# Patient Record
Sex: MALE | Race: OTHER | HISPANIC OR LATINO | URBAN - METROPOLITAN AREA
[De-identification: names, ages, dates, MRNs, and addresses within clinical notes are randomized per-mention and may not be internally consistent; named-entity substitution may affect disease eponyms.]

---

## 2018-06-25 ENCOUNTER — EMERGENCY (EMERGENCY)
Facility: HOSPITAL | Age: 33
LOS: 0 days | Discharge: HOME | End: 2018-06-25
Attending: EMERGENCY MEDICINE | Admitting: EMERGENCY MEDICINE

## 2018-06-25 VITALS
SYSTOLIC BLOOD PRESSURE: 123 MMHG | RESPIRATION RATE: 19 BRPM | OXYGEN SATURATION: 97 % | DIASTOLIC BLOOD PRESSURE: 67 MMHG | HEART RATE: 98 BPM | TEMPERATURE: 96 F

## 2018-06-25 DIAGNOSIS — F10.129 ALCOHOL ABUSE WITH INTOXICATION, UNSPECIFIED: ICD-10-CM

## 2018-06-25 DIAGNOSIS — Z87.891 PERSONAL HISTORY OF NICOTINE DEPENDENCE: ICD-10-CM

## 2018-06-25 NOTE — ED PROVIDER NOTE - PHYSICAL EXAMINATION
VITAL SIGNS: I have reviewed nursing notes and confirm.  CONSTITUTIONAL: + AOB, alert  EYES: PERRLA  ENT: chronic deformity to nose with no tenderness or new swelling  CARD: RRR, no murmurs, rubs or gallops  RESP: clear to ausculation b/l.  No rales, rhonchi, or wheezing.  ABD: soft, + BS, non-tender, non-distended, no rebound or guarding.   EXT: Full ROM, no bony tenderness, gait normal  NEURO: normal motor. normal sensory. CN II-XII intact. Cerebellar testing normal. Normal gait.  PSYCH: Cooperative, appropriate. Brett HI or SI

## 2018-06-25 NOTE — ED PROVIDER NOTE - MEDICAL DECISION MAKING DETAILS
Patient with stable gait and denies HI or SI.  Given [employer] and water/juice and has a safe way of getting home.  Patient does not want to stay in hospital any longer and shows no signs warranting stay in ED against his wishes. Mild AOB but patient fully conversational in Japanese and denies any other drug use or ingestants.  Will give Detox number.  Will DC with proper instructions.

## 2018-06-25 NOTE — ED PROVIDER NOTE - NS ED ROS FT
Constitutional:  No fever  Cardiac:  No chest pain or palpitations  Respiratory:  No cough or respiratory distress.   GI:  No nausea, vomiting, diarrhea or abdominal pain.  MS:  No back or joint pain.  Neuro:  No headache.

## 2018-06-25 NOTE — ED PROVIDER NOTE - OBJECTIVE STATEMENT
33 y/o M PMH ETOH Abuse who presents BIBEMS for alcohol intoxication.  Patient BIBEMS intoxicated after being found asleep in a strangers backyard.  he denies any recent falls or injuries.  He denies any pain. Denies any drug use.  He does not want to stay in the Emergency Department.

## 2018-06-25 NOTE — ED ADULT NURSE NOTE - OBJECTIVE STATEMENT
Pt BIBA after being found asleep in a strangers backyard. Pt with no complaints, ambulating steadily. Pt seen and evaluated by Dr cshneider.

## 2019-03-06 ENCOUNTER — EMERGENCY (EMERGENCY)
Facility: HOSPITAL | Age: 34
LOS: 0 days | Discharge: HOME | End: 2019-03-06
Attending: EMERGENCY MEDICINE | Admitting: EMERGENCY MEDICINE

## 2019-03-06 VITALS
HEART RATE: 86 BPM | TEMPERATURE: 98 F | SYSTOLIC BLOOD PRESSURE: 135 MMHG | DIASTOLIC BLOOD PRESSURE: 78 MMHG | RESPIRATION RATE: 18 BRPM | OXYGEN SATURATION: 98 %

## 2019-03-06 DIAGNOSIS — F10.129 ALCOHOL ABUSE WITH INTOXICATION, UNSPECIFIED: ICD-10-CM

## 2019-03-06 DIAGNOSIS — F17.200 NICOTINE DEPENDENCE, UNSPECIFIED, UNCOMPLICATED: ICD-10-CM

## 2019-03-06 PROBLEM — F10.10 ALCOHOL ABUSE, UNCOMPLICATED: Chronic | Status: ACTIVE | Noted: 2018-06-25

## 2019-03-06 NOTE — ED PROVIDER NOTE - ATTENDING CONTRIBUTION TO CARE
I personally evaluated the patient. I reviewed the Resident’s or Physician Assistant’s note (as assigned above), and agree with the findings and plan except as documented in my note.  34 yo man was walking across the [facility] to get back home and because he was not supposed to be there he was picked up by police and brought to hospital.  No complaints.  Not suicidal or homicidal.  Awake and alert.  No evidence of psychosis.  No evidence of clinical intoxication.  Stable for discharge.

## 2019-03-06 NOTE — ED PROVIDER NOTE - NSFOLLOWUPCLINICS_GEN_ALL_ED_FT
[facility]  Rehabilitation  [address] [place] [address]  Phone: [phone]  Fax:   Follow Up Time:

## 2019-03-06 NOTE — ED PROVIDER NOTE - OBJECTIVE STATEMENT
34 y/o male with hx Alcohol abuse presents to the ED after [place]. Patient states didn't have money for bus so decided to walk. Patient denies suicidal ideation/ homicidal ideation.

## 2019-03-06 NOTE — ED PROVIDER NOTE - CLINICAL SUMMARY MEDICAL DECISION MAKING FREE TEXT BOX
32 yo man with h/o EtOH abuse was picked up by police and brought here because he was walking over the [facility] bridge.  Known history of ETOH abuse but no evidence of intoxication at this time.  Fingerstick normal.  Stable for discharge as patient has no complaints.

## 2019-03-16 ENCOUNTER — INPATIENT (INPATIENT)
Facility: HOSPITAL | Age: 34
LOS: 2 days | Discharge: LEFT BEFORE DISCHARGE | End: 2019-03-19
Attending: HOSPITALIST | Admitting: HOSPITALIST

## 2019-03-16 VITALS
TEMPERATURE: 96 F | DIASTOLIC BLOOD PRESSURE: 65 MMHG | OXYGEN SATURATION: 100 % | RESPIRATION RATE: 19 BRPM | HEART RATE: 91 BPM | SYSTOLIC BLOOD PRESSURE: 117 MMHG

## 2019-03-16 NOTE — ED ADULT TRIAGE NOTE - CHIEF COMPLAINT QUOTE
[place] via [employer], patient states [place] was on [address] [place] [address], patient states he drinks 6-7 beers per day, drinking daily for 6 months, last drink was 1 hour PTA 7 beers PTA, denies drug use, Denies suicidal ideation. patient is requesting detox .

## 2019-03-16 NOTE — ED ADULT NURSE NOTE - CHPI ED NUR SYMPTOMS POS
patient is requesting detox from alcohol, states he drinks 6-7 beers per day for the last 6 months, last drink was 1 hour PTA when he consumed 7 beers.

## 2019-03-16 NOTE — ED ADULT NURSE NOTE - CHIEF COMPLAINT QUOTE
[place] via [employer], patient [place] [address] [place] [address], patient states he drinks 6-7 beers per day, drinking daily for 6 months, last drink was 1 hour PTA 7 beers PTA, denies drug use, Denies suicidal ideation. patient is requesting detox .

## 2019-03-16 NOTE — ED ADULT NURSE NOTE - NSIMPLEMENTINTERV_GEN_ALL_ED
Implemented All Fall Risk Interventions:  [place] to call system. Call bell, personal items and telephone within reach. Instruct patient to call for assistance. Room bathroom lighting operational. Non-slip footwear when patient is off stretcher. Physically safe environment: no spills, clutter or unnecessary equipment. Stretcher in lowest position, wheels locked, appropriate side rails in place. Provide visual cue, wrist band, yellow gown, etc. Monitor gait and stability. Monitor for mental status changes and reorient to person, place, and time. Review medications for side effects contributing to fall risk. Reinforce activity limits and safety measures with patient and family.

## 2019-03-16 NOTE — ED ADULT TRIAGE NOTE - LANGUAGE ASSISTANCE NEEDED
No-Patient/Caregiver offered and refused free interpretation services./patient is refusing to use video  at this time.

## 2019-03-16 NOTE — ED ADULT NURSE NOTE - LANGUAGE ASSISTANCE NEEDED
patient is refusing to use video  at this time./No-Patient/Caregiver offered and refused free interpretation services.

## 2019-03-17 LAB
ALBUMIN SERPL ELPH-MCNC: 4.2 G/DL — SIGNIFICANT CHANGE UP (ref 3.5–5.2)
ALLERGY+IMMUNOLOGY DIAG STUDY NOTE: SIGNIFICANT CHANGE UP
ALP SERPL-CCNC: 185 U/L — HIGH (ref 30–115)
ALT FLD-CCNC: 32 U/L — SIGNIFICANT CHANGE UP (ref 0–41)
AMMONIA BLD-MCNC: 45 UMOL/L — SIGNIFICANT CHANGE UP (ref 11–55)
ANION GAP SERPL CALC-SCNC: 13 MMOL/L — SIGNIFICANT CHANGE UP (ref 7–14)
APAP SERPL-MCNC: <5 UG/ML — LOW (ref 10–30)
AST SERPL-CCNC: 116 U/L — HIGH (ref 0–41)
BASOPHILS # BLD AUTO: 0.04 K/UL — SIGNIFICANT CHANGE UP (ref 0–0.2)
BASOPHILS NFR BLD AUTO: 0.6 % — SIGNIFICANT CHANGE UP (ref 0–1)
BILIRUB DIRECT SERPL-MCNC: <0.2 MG/DL — SIGNIFICANT CHANGE UP (ref 0–0.2)
BILIRUB INDIRECT FLD-MCNC: >0.3 MG/DL — SIGNIFICANT CHANGE UP (ref 0.2–1.2)
BILIRUB SERPL-MCNC: 0.5 MG/DL — SIGNIFICANT CHANGE UP (ref 0.2–1.2)
BUN SERPL-MCNC: 6 MG/DL — LOW (ref 10–20)
CALCIUM SERPL-MCNC: 8.3 MG/DL — LOW (ref 8.5–10.1)
CHLORIDE SERPL-SCNC: 106 MMOL/L — SIGNIFICANT CHANGE UP (ref 98–110)
CO2 SERPL-SCNC: 23 MMOL/L — SIGNIFICANT CHANGE UP (ref 17–32)
CREAT SERPL-MCNC: 0.6 MG/DL — LOW (ref 0.7–1.5)
EOSINOPHIL # BLD AUTO: 0.16 K/UL — SIGNIFICANT CHANGE UP (ref 0–0.7)
EOSINOPHIL NFR BLD AUTO: 2.5 % — SIGNIFICANT CHANGE UP (ref 0–8)
ETHANOL SERPL-MCNC: 171 MG/DL — HIGH
GLUCOSE SERPL-MCNC: 92 MG/DL — SIGNIFICANT CHANGE UP (ref 70–99)
HAV IGM SER-ACNC: SIGNIFICANT CHANGE UP
HBV CORE IGM SER-ACNC: SIGNIFICANT CHANGE UP
HBV SURFACE AG SER-ACNC: SIGNIFICANT CHANGE UP
HCT VFR BLD CALC: 25 % — LOW (ref 42–52)
HCT VFR BLD CALC: 25.3 % — LOW (ref 42–52)
HCV AB S/CO SERPL IA: 0.15 S/CO — SIGNIFICANT CHANGE UP (ref 0–0.79)
HCV AB SERPL-IMP: SIGNIFICANT CHANGE UP
HGB BLD-MCNC: 7.1 G/DL — CRITICAL LOW (ref 14–18)
HGB BLD-MCNC: 7.1 G/DL — CRITICAL LOW (ref 14–18)
IMM GRANULOCYTES NFR BLD AUTO: 0.2 % — SIGNIFICANT CHANGE UP (ref 0.1–0.3)
LIDOCAIN IGE QN: 17 U/L — SIGNIFICANT CHANGE UP (ref 7–60)
LYMPHOCYTES # BLD AUTO: 1.41 K/UL — SIGNIFICANT CHANGE UP (ref 1.2–3.4)
LYMPHOCYTES # BLD AUTO: 22.4 % — SIGNIFICANT CHANGE UP (ref 20.5–51.1)
MAGNESIUM SERPL-MCNC: 2 MG/DL — SIGNIFICANT CHANGE UP (ref 1.8–2.4)
MCHC RBC-ENTMCNC: 19.5 PG — LOW (ref 27–31)
MCHC RBC-ENTMCNC: 19.5 PG — LOW (ref 27–31)
MCHC RBC-ENTMCNC: 28.1 G/DL — LOW (ref 32–37)
MCHC RBC-ENTMCNC: 28.4 G/DL — LOW (ref 32–37)
MCV RBC AUTO: 68.7 FL — LOW (ref 80–94)
MCV RBC AUTO: 69.5 FL — LOW (ref 80–94)
MONOCYTES # BLD AUTO: 0.66 K/UL — HIGH (ref 0.1–0.6)
MONOCYTES NFR BLD AUTO: 10.5 % — HIGH (ref 1.7–9.3)
NEUTROPHILS # BLD AUTO: 4.02 K/UL — SIGNIFICANT CHANGE UP (ref 1.4–6.5)
NEUTROPHILS NFR BLD AUTO: 63.8 % — SIGNIFICANT CHANGE UP (ref 42.2–75.2)
NRBC # BLD: 0 /100 WBCS — SIGNIFICANT CHANGE UP (ref 0–0)
NRBC # BLD: 0 /100 WBCS — SIGNIFICANT CHANGE UP (ref 0–0)
PLATELET # BLD AUTO: 184 K/UL — SIGNIFICANT CHANGE UP (ref 130–400)
PLATELET # BLD AUTO: 185 K/UL — SIGNIFICANT CHANGE UP (ref 130–400)
POTASSIUM SERPL-MCNC: 3.6 MMOL/L — SIGNIFICANT CHANGE UP (ref 3.5–5)
POTASSIUM SERPL-SCNC: 3.6 MMOL/L — SIGNIFICANT CHANGE UP (ref 3.5–5)
PROT SERPL-MCNC: 7.9 G/DL — SIGNIFICANT CHANGE UP (ref 6–8)
RBC # BLD: 3.64 M/UL — LOW (ref 4.7–6.1)
RBC # BLD: 3.64 M/UL — LOW (ref 4.7–6.1)
RBC # FLD: 20.7 % — HIGH (ref 11.5–14.5)
RBC # FLD: 20.7 % — HIGH (ref 11.5–14.5)
SALICYLATES SERPL-MCNC: <0.3 MG/DL — LOW (ref 4–30)
SODIUM SERPL-SCNC: 142 MMOL/L — SIGNIFICANT CHANGE UP (ref 135–146)
TYPE + AB SCN PNL BLD: SIGNIFICANT CHANGE UP
WBC # BLD: 6.3 K/UL — SIGNIFICANT CHANGE UP (ref 4.8–10.8)
WBC # BLD: 6.54 K/UL — SIGNIFICANT CHANGE UP (ref 4.8–10.8)
WBC # FLD AUTO: 6.3 K/UL — SIGNIFICANT CHANGE UP (ref 4.8–10.8)
WBC # FLD AUTO: 6.54 K/UL — SIGNIFICANT CHANGE UP (ref 4.8–10.8)

## 2019-03-17 RX ORDER — FOLIC ACID 0.8 MG
1 TABLET ORAL DAILY
Qty: 0 | Refills: 0 | Status: DISCONTINUED | OUTPATIENT
Start: 2019-03-17 | End: 2019-03-19

## 2019-03-17 RX ORDER — PANTOPRAZOLE SODIUM 20 MG/1
40 TABLET, DELAYED RELEASE ORAL
Qty: 0 | Refills: 0 | Status: DISCONTINUED | OUTPATIENT
Start: 2019-03-17 | End: 2019-03-19

## 2019-03-17 RX ORDER — THIAMINE MONONITRATE (VIT B1) 100 MG
100 TABLET ORAL DAILY
Qty: 0 | Refills: 0 | Status: DISCONTINUED | OUTPATIENT
Start: 2019-03-17 | End: 2019-03-19

## 2019-03-17 RX ADMIN — Medication 1 TABLET(S): at 17:29

## 2019-03-17 RX ADMIN — Medication 25 MILLIGRAM(S): at 10:17

## 2019-03-17 RX ADMIN — Medication 1 MILLIGRAM(S): at 17:29

## 2019-03-17 RX ADMIN — Medication 25 MILLIGRAM(S): at 14:06

## 2019-03-17 RX ADMIN — PANTOPRAZOLE SODIUM 40 MILLIGRAM(S): 20 TABLET, DELAYED RELEASE ORAL at 17:29

## 2019-03-17 RX ADMIN — Medication 25 MILLIGRAM(S): at 17:27

## 2019-03-17 RX ADMIN — Medication 25 MILLIGRAM(S): at 22:09

## 2019-03-17 RX ADMIN — Medication 100 MILLIGRAM(S): at 17:29

## 2019-03-17 NOTE — H&P ADULT - ASSESSMENT
34 yo M with chronic alcohol dependence, homeless presents after being found asleep on street    Alcohol dependence  -pt desires detox  -start librium protocol  -detox consult  -thiamine, folate, MVI    Suspected thiamine, folate deficiency given alcohol abuse  -check levels  -supplement    Microcytic anemia  -evaluate for thalassemia and iron deficiency anemia  -trend H&H as pt had black BM  -send FOBT, protonix, maintain 2 18 gauge IV    L3 transverse fracture  -complains of pain at site  -neuro surgery consult  Chronic encephalomalacia  -neuro c/s    DVT px  -SCD for now  Full Code  Regular diet 32 yo M with chronic alcohol dependence, homeless presents after being found asleep on street    Alcohol dependence  -pt desires detox  -start librium protocol  -detox consult  -thiamine, folate, MVI  -check phos, mg, monitor for refeeding syndrome    Suspected thiamine, folate deficiency given alcohol abuse  -check levels  -supplement    Microcytic anemia  -evaluate for thalassemia and iron deficiency anemia  -trend H&H as pt had black BM  -send FOBT, protonix, maintain 2 18 gauge IV    L3 transverse fracture  -complains of pain at site  -neuro surgery consult  -bedrest for now    Chronic encephalomalacia  -neuro c/s    Homelessness  -social work c/s    DVT px  -SCD for now  Full Code  Regular diet 32 yo M with chronic alcohol dependence, homeless presents after being found asleep on street    Alcohol dependence  -CIWA 0  -pt desires detox  -start librium protocol  -detox consult  -thiamine, folate, MVI  -check phos, mg, monitor for refeeding syndrome    Suspected thiamine, folate deficiency given alcohol abuse  -check levels  -supplement    Microcytic anemia  -evaluate for thalassemia and iron deficiency anemia  -trend H&H as pt had black BM  -send FOBT, protonix, maintain 2 18 gauge IV    L3 transverse fracture  -complains of pain at site  -neuro surgery consult  -bedrest for now    Chronic encephalomalacia  -neuro c/s    Homelessness  -social work c/s    DVT px  -SCD for now  Full Code  Regular diet

## 2019-03-17 NOTE — H&P ADULT - NSHPLABSRESULTS_GEN_ALL_CORE
7.1    6.54  )-----------( 185      ( 17 Mar 2019 06:05 )             25.3       03-17    142  |  106  |  6<L>  ----------------------------<  92  3.6   |  23  |  0.6<L>    Ca    8.3<L>      17 Mar 2019 05:00  Mg     2.0     03-17    TPro  7.9  /  Alb  4.2  /  TBili  0.5  /  DBili  <0.2  /  AST  116<H>  /  ALT  32  /  AlkPhos  185<H>  03-17     < from: CT Abdomen and Pelvis w/ IV Cont (03.17.19 @ 07:00) >    Nondisplaced fracture involving right L3 transverse process.    Otherwise, noevidence for acute intrathoracic or intra-abdominal   traumatic injury.    Questionable nondisplaced left third rib fracture versus motion artifact.   Please correlate with point tenderness.    < end of copied text >    < from: CT Chest w/ IV Cont (03.17.19 @ 07:00) >    Nondisplaced fracture involving right L3 transverse process.    Otherwise, noevidence for acute intrathoracic or intra-abdominal   traumatic injury.    Questionable nondisplaced left third rib fracture versus motion artifact.   Please correlate with point tenderness.    < end of copied text >    < from: CT Cervical Spine No Cont (03.17.19 @ 07:00) >    No acute fracture or significant subluxation of the cervical spine.    < end of copied text >    < from: CT Head No Cont (03.17.19 @ 07:00) >    No CT evidence for acute intracranial pathology.      Chronic encephalomalacia involving the right inferior frontal and   temporal lobes. In the appropriate clinical setting, this is compatible   with sequela of prior trauma.    < end of copied text >

## 2019-03-17 NOTE — H&P ADULT - HISTORY OF PRESENT ILLNESS
34 yo M with alcohol dependence, homeless BIBA after being found sleeping on the street. Pt does not recall this event. States he drinks 7-8 beers daily x 6 years. Denies any other substance use. Pt states he wants detox. Lives in homeless shelter, currently unemployed. Denies HA, SOB, abdominal pain, tactile, visual, or auditory hallicuniations, palpitations, tremors. States yesterday he had large BM which was dark black, never had that before. Also experienced nose bleed, self resolving over 1 hours. Had similar nose bleed 1 year ago. Denies history of anemia or family history of blood disorders. No NSAID use. 32 yo M with alcohol dependence, homeless BIBA after being found sleeping on the street. Pt does not recall this event. States he drinks 7-8 beers daily x 6 years. Denies any other substance use. Pt states he wants detox. Lives in homeless shelter, currently unemployed. Denies HA, SOB, abdominal pain, tactile, visual, or auditory hallicuniations, palpitations, tremors. States yesterday he had large BM which was dark black, never had that before. Also experienced nose bleed, self resolving over 1 hours. Had similar nose bleed 1 year ago. Denies history of anemia or family history of blood disorders. No NSAID use. Reports left sided back pain while getting up and sitting down

## 2019-03-17 NOTE — ED PROVIDER NOTE - PROGRESS NOTE DETAILS
Lab called with critical value for Hgb. Went to re-eval patient. patient now much more alert and explains that yesterday he was in [facility] for a nosebleed and was discharged home and he has paperwork with him. patient has no labs on papers. Pt denies rectal bleeding. there is no bruising on his body except as previously noted on his right upper arm. ABD soft NT/ND. Pt refusing rectal. Given patient history of ETOH abuse and Low Hgb will send Type and screen and scan patient to look for traumatic causes of anemia. D/w Dr. Leslie wants patient admitted to medicine will follow up on consult. D/w Hospitalist accep

## 2019-03-17 NOTE — ED PROVIDER NOTE - ATTENDING CONTRIBUTION TO CARE
I personally evaluated the patient. I reviewed the Resident’s or Physician Assistant’s note (as assigned above), and agree with the findings and plan except as documented in my note.  Chart reviewed. H/O alcohol abuse, requesting alcohol detox. Exam shows drowsy intoxicated patient in no distress, HEENT NCAT, lungs clear, RR S1S2, abdomen soft Nt +BS, mild ecchymosis right arm, neuro no focal deficits. Will order detox protocol.

## 2019-03-17 NOTE — ED PROVIDER NOTE - OBJECTIVE STATEMENT
33 year old male past medical history of ETOH abuse brought in by ambulance after patient was found sleeping on street. EMS stated on arrival he wanted detox and was transported to hospital. patient states that he drinks 6-7 large beers per day daily. Patient denies drug use, Denies suicidal ideation

## 2019-03-17 NOTE — H&P ADULT - NSHPPHYSICALEXAM_GEN_ALL_CORE
ICU Vital Signs Last 24 Hrs  T(C): 37.2 (17 Mar 2019 08:24), Max: 37.2 (17 Mar 2019 08:24)  T(F): 99 (17 Mar 2019 08:24), Max: 99 (17 Mar 2019 08:24)  HR: 97 (17 Mar 2019 08:24) (91 - 100)  BP: 125/74 (17 Mar 2019 08:24) (97/55 - 125/74)  RR: 16 (17 Mar 2019 08:24) (16 - 19)  SpO2: 100% (17 Mar 2019 07:53) (98% - 100%)    PHYSICAL EXAM:  GENERAL: NAD, speaks in full sentences, no signs of respiratory distress  HEAD:  Atraumatic, Normocephalic  EYES: Anicteric  NECK: Supple, No JVD  CHEST/LUNG: Clear to auscultation bilaterally; No wheeze; No crackles; No accessory muscles used  HEART: Regular rate and rhythm; No murmurs;   ABDOMEN: Soft, Nontender, Nondistended; Bowel sounds present; No guarding  EXTREMITIES:  2+ Peripheral Pulses, No cyanosis or edema  PSYCH: AAOx2-to self and place, not year  NEUROLOGY: non-focal  SKIN: No rashes or lesions

## 2019-03-17 NOTE — ED PROVIDER NOTE - PHYSICAL EXAMINATION
Physical Exam    Vital Signs: I have reviewed the initial vital signs.  Constitutional: well-nourished, appears stated age, no acute distress  Eyes: Conjunctiva pink, Sclera clear, PERRLA, EOMI.  Cardiovascular: S1 and S2, regular rate, regular rhythm, well-perfused extremities, radial pulses equal and 2+  Respiratory: unlabored respiratory effort, clear to auscultation bilaterally no wheezing, rales and rhonchi  Gastrointestinal: soft, non-tender abdomen, no pulsatile mass, normal bowl sounds  Musculoskeletal: supple neck, no lower extremity edema, no midline tenderness  Integumentary: warm, dry, no rash  Neurologic: awake, alert, cranial nerves II-XII grossly intact, extremities’ motor and sensory functions grossly intact  Psychiatric: appropriate mood, appropriate affect Physical Exam    Vital Signs: I have reviewed the initial vital signs.  Constitutional: well-nourished, appears stated age, no acute distress  Eyes: Conjunctiva pink, Sclera clear, PERRLA, EOMI.  Cardiovascular: S1 and S2, regular rate, regular rhythm, well-perfused extremities, radial pulses equal and 2+  Respiratory: unlabored respiratory effort, clear to auscultation bilaterally no wheezing, rales and rhonchi  Gastrointestinal: soft, non-tender abdomen, no pulsatile mass, normal bowl sounds  Musculoskeletal: supple neck, no lower extremity edema, no midline tenderness  Integumentary: warm, dry, no rash + ecchymosis to rigth upper arm.   Neurologic: awake, alert, cranial nerves II-XII grossly intact, extremities’ motor and sensory functions grossly intact  Psychiatric: appropriate mood, appropriate affect

## 2019-03-17 NOTE — ED PROVIDER NOTE - CLINICAL SUMMARY MEDICAL DECISION MAKING FREE TEXT BOX
Labs reviewed. Patient found to be anemic. CT head/chest/abdo negative. Rectal refused. Will admit to medicine. Dr. Garcia consulted.

## 2019-03-17 NOTE — ED ADULT NURSE REASSESSMENT NOTE - NS ED NURSE REASSESS COMMENT FT1
Pt with low Hgb/Hct 7.1/25.0 pt was moved to cardiac area, and IV line inserted, Type and cross sent to the lab, Pt is alert, denies hitting head, paperwork found on pt that states he was in [facility] on 3/16/19 for epistasix, pt stated, he was told that he was anemic today, per pt, no treatment was ordered.
patient refusing lab draw, non compliant, uncooperative, PA Naeem made aware, safety maintained, will continue to monitor
patient resting in stretcher, in no distress at this time, safety maintained, VSS, no complaints at this time

## 2019-03-18 LAB
ALBUMIN SERPL ELPH-MCNC: 4 G/DL — SIGNIFICANT CHANGE UP (ref 3.5–5.2)
ALP SERPL-CCNC: 185 U/L — HIGH (ref 30–115)
ALT FLD-CCNC: 34 U/L — SIGNIFICANT CHANGE UP (ref 0–41)
ANION GAP SERPL CALC-SCNC: 11 MMOL/L — SIGNIFICANT CHANGE UP (ref 7–14)
APTT BLD: 31.2 SEC — SIGNIFICANT CHANGE UP (ref 27–39.2)
AST SERPL-CCNC: 112 U/L — HIGH (ref 0–41)
BASOPHILS # BLD AUTO: 0.04 K/UL — SIGNIFICANT CHANGE UP (ref 0–0.2)
BASOPHILS NFR BLD AUTO: 0.6 % — SIGNIFICANT CHANGE UP (ref 0–1)
BILIRUB SERPL-MCNC: 1.1 MG/DL — SIGNIFICANT CHANGE UP (ref 0.2–1.2)
BUN SERPL-MCNC: 8 MG/DL — LOW (ref 10–20)
CALCIUM SERPL-MCNC: 8.7 MG/DL — SIGNIFICANT CHANGE UP (ref 8.5–10.1)
CHLORIDE SERPL-SCNC: 98 MMOL/L — SIGNIFICANT CHANGE UP (ref 98–110)
CO2 SERPL-SCNC: 26 MMOL/L — SIGNIFICANT CHANGE UP (ref 17–32)
CREAT SERPL-MCNC: 0.6 MG/DL — LOW (ref 0.7–1.5)
EOSINOPHIL # BLD AUTO: 0.18 K/UL — SIGNIFICANT CHANGE UP (ref 0–0.7)
EOSINOPHIL NFR BLD AUTO: 2.6 % — SIGNIFICANT CHANGE UP (ref 0–8)
GLUCOSE SERPL-MCNC: 110 MG/DL — HIGH (ref 70–99)
HCT VFR BLD CALC: 22.9 % — LOW (ref 42–52)
HCT VFR BLD CALC: 25.4 % — LOW (ref 42–52)
HGB BLD-MCNC: 6.3 G/DL — CRITICAL LOW (ref 14–18)
HGB BLD-MCNC: 7.1 G/DL — CRITICAL LOW (ref 14–18)
IMM GRANULOCYTES NFR BLD AUTO: 0.1 % — SIGNIFICANT CHANGE UP (ref 0.1–0.3)
INR BLD: 1.2 RATIO — SIGNIFICANT CHANGE UP (ref 0.65–1.3)
IRON SATN MFR SERPL: 20 UG/DL — LOW (ref 35–150)
IRON SATN MFR SERPL: 5 % — LOW (ref 15–50)
LYMPHOCYTES # BLD AUTO: 1.14 K/UL — LOW (ref 1.2–3.4)
LYMPHOCYTES # BLD AUTO: 16.3 % — LOW (ref 20.5–51.1)
MAGNESIUM SERPL-MCNC: 1.8 MG/DL — SIGNIFICANT CHANGE UP (ref 1.8–2.4)
MCHC RBC-ENTMCNC: 19.4 PG — LOW (ref 27–31)
MCHC RBC-ENTMCNC: 19.5 PG — LOW (ref 27–31)
MCHC RBC-ENTMCNC: 27.5 G/DL — LOW (ref 32–37)
MCHC RBC-ENTMCNC: 28 G/DL — LOW (ref 32–37)
MCV RBC AUTO: 69.8 FL — LOW (ref 80–94)
MCV RBC AUTO: 70.7 FL — LOW (ref 80–94)
MONOCYTES # BLD AUTO: 0.61 K/UL — HIGH (ref 0.1–0.6)
MONOCYTES NFR BLD AUTO: 8.7 % — SIGNIFICANT CHANGE UP (ref 1.7–9.3)
NEUTROPHILS # BLD AUTO: 5.03 K/UL — SIGNIFICANT CHANGE UP (ref 1.4–6.5)
NEUTROPHILS NFR BLD AUTO: 71.7 % — SIGNIFICANT CHANGE UP (ref 42.2–75.2)
NRBC # BLD: 0 /100 WBCS — SIGNIFICANT CHANGE UP (ref 0–0)
NRBC # BLD: 0 /100 WBCS — SIGNIFICANT CHANGE UP (ref 0–0)
PHOSPHATE SERPL-MCNC: 4.1 MG/DL — SIGNIFICANT CHANGE UP (ref 2.1–4.9)
PLATELET # BLD AUTO: 169 K/UL — SIGNIFICANT CHANGE UP (ref 130–400)
PLATELET # BLD AUTO: 178 K/UL — SIGNIFICANT CHANGE UP (ref 130–400)
POTASSIUM SERPL-MCNC: 3.7 MMOL/L — SIGNIFICANT CHANGE UP (ref 3.5–5)
POTASSIUM SERPL-SCNC: 3.7 MMOL/L — SIGNIFICANT CHANGE UP (ref 3.5–5)
PROT SERPL-MCNC: 7.4 G/DL — SIGNIFICANT CHANGE UP (ref 6–8)
PROTHROM AB SERPL-ACNC: 13.8 SEC — HIGH (ref 9.95–12.87)
RBC # BLD: 3.24 M/UL — LOW (ref 4.7–6.1)
RBC # BLD: 3.64 M/UL — LOW (ref 4.7–6.1)
RBC # FLD: 21.2 % — HIGH (ref 11.5–14.5)
RBC # FLD: 21.4 % — HIGH (ref 11.5–14.5)
SODIUM SERPL-SCNC: 135 MMOL/L — SIGNIFICANT CHANGE UP (ref 135–146)
T PALLIDUM AB TITR SER: NEGATIVE — SIGNIFICANT CHANGE UP
TIBC SERPL-MCNC: 414 UG/DL — SIGNIFICANT CHANGE UP (ref 220–430)
TRANSFERRIN SERPL-MCNC: 325 MG/DL — SIGNIFICANT CHANGE UP (ref 200–360)
UIBC SERPL-MCNC: 394 UG/DL — HIGH (ref 110–370)
WBC # BLD: 7.01 K/UL — SIGNIFICANT CHANGE UP (ref 4.8–10.8)
WBC # BLD: 7.73 K/UL — SIGNIFICANT CHANGE UP (ref 4.8–10.8)
WBC # FLD AUTO: 7.01 K/UL — SIGNIFICANT CHANGE UP (ref 4.8–10.8)
WBC # FLD AUTO: 7.73 K/UL — SIGNIFICANT CHANGE UP (ref 4.8–10.8)

## 2019-03-18 RX ADMIN — Medication 20 MILLIGRAM(S): at 21:14

## 2019-03-18 RX ADMIN — Medication 100 MILLIGRAM(S): at 11:36

## 2019-03-18 RX ADMIN — Medication 20 MILLIGRAM(S): at 14:36

## 2019-03-18 RX ADMIN — Medication 25 MILLIGRAM(S): at 05:41

## 2019-03-18 RX ADMIN — Medication 1 MILLIGRAM(S): at 11:36

## 2019-03-18 RX ADMIN — Medication 20 MILLIGRAM(S): at 17:42

## 2019-03-18 RX ADMIN — Medication 1 TABLET(S): at 11:36

## 2019-03-18 RX ADMIN — Medication 25 MILLIGRAM(S): at 02:34

## 2019-03-18 RX ADMIN — Medication 20 MILLIGRAM(S): at 10:28

## 2019-03-18 RX ADMIN — PANTOPRAZOLE SODIUM 40 MILLIGRAM(S): 20 TABLET, DELAYED RELEASE ORAL at 05:41

## 2019-03-18 RX ADMIN — PANTOPRAZOLE SODIUM 40 MILLIGRAM(S): 20 TABLET, DELAYED RELEASE ORAL at 18:38

## 2019-03-18 NOTE — PROGRESS NOTE ADULT - SUBJECTIVE AND OBJECTIVE BOX
Hospitalist on call    GI recommendations noted  HGB >8  Transfuse patient    Dx anemia unspecified r/o GI bleed    Plan  Type and screen, crossmatch and transfuse 1 unit PRBC  Consent obtained from patient with an  witnessed by RN  Patient received transfusion 6 months ago  Plan for EGD tomorrow      Dr Carmona  [phone]

## 2019-03-18 NOTE — CONSULT NOTE ADULT - ASSESSMENT
32 yo male phm ETOH abuse 6-7 beers a day for 6 years, presents for alcohol detox. Patient denies nausea, vomiting, hematemesis, abdominal pian, diarrhea, constipation. +Intermittent melenic stools. Patient states he had one melenic formed bowel movement at 6pm yesterday and one episode at 8AM today.       Problem 1-Anemia with Intermittent Melenic Bowel Movements   Rec  -Maintain hemodynamic stability  -PPI drip  -Transfuse to hgb>8  -Monitor Vitals  -Monitor H and H  -CBC q8  -Active type and screen  -To discuss with Dr. Garvey patient will likely need an EGD  -Alcohol Cessation discussed and strongly advised   -DTR ppx   -Keep patient NPO 32 yo male phm ETOH abuse 6-7 beers a day for 6 years, presents for alcohol detox. Patient denies nausea, vomiting, hematemesis, abdominal pian, diarrhea, constipation. +Intermittent melenic stools. Patient states he had one melenic formed bowel movement at 6pm yesterday and one episode at 8AM today.     Problem 1-Anemia with Intermittent Melenic Bowel Movements   Rec  -Maintain hemodynamic stability  -PPI BID  -Transfuse to hgb>8  -Monitor Vitals  -Monitor H and H  -CBC q8  -Active type and screen  -Patient will need an EGD, if hemodynamically stable EGD can be arranged as an outpatient with our [facility] [phone]   -Alcohol Cessation discussed and strongly advised   -DTR ppx     Problem 2-Nondisplaced fracture involving right L3 transverse process.  Rec  -Care as per Neurosurgery Team 34 yo male phm ETOH abuse 6-7 beers a day for 6 years, presents for alcohol detox. Patient denies nausea, vomiting, hematemesis, abdominal pian, diarrhea, constipation. +Intermittent melenic stools. Patient states he had one melenic formed bowel movement at 6pm yesterday and one episode at 8AM today.     Problem 1-Anemia with Intermittent Melenic Bowel Movements   Rec  -Maintain hemodynamic stability  Neurosurgical clearance for endoscopy, where head is extended, given cervical spine issues.  -PPI BID  -Transfuse to hgb>8  -Monitor Vitals  -Monitor H and H  -CBC q8  -Active type and screen  -Patient will need an EGD, if hemodynamically stable EGD can be arranged as an outpatient with our [facility] [phone]   -Alcohol Cessation discussed and strongly advised   -DTR ppx     Problem 2-Nondisplaced fracture involving right L3 transverse process.  Rec  -Care as per Neurosurgery Team

## 2019-03-18 NOTE — PROGRESS NOTE ADULT - ASSESSMENT
32 yo M with chronic alcohol dependence, homeless presents after being found asleep on street    Alcohol dependence  -remains CIWA 0  -pt desires detox  -start librium protocol  -detox consult appreciated  -thiamine, folate, MVI  -check phos, mg, monitor for refeeding syndrome-pending    Suspected thiamine, folate deficiency given alcohol abuse  -check levels  -supplement    Microcytic anemia  -evaluate iron deficiency anemia, thalamsemia testing outpt  -trend H&H as pt had black BM  -send FOBT, protonix, maintain 2 18 gauge IV    L3 transverse fracture  -complains of pain at site  -neuro surgery consult pending  -bedrest for now    Chronic encephalomalacia  -no acute intervention    Homelessness  -social work c/s    DVT px  -SCD for now  Full Code  Regular diet

## 2019-03-18 NOTE — CONSULT NOTE ADULT - ASSESSMENT
ETOH abuse/ intoxication  Severe anemia  r/o GI bleed      counselling done  continue bhakti protocol  thiamine supplements  GI eval  PPI's  Once medically cleared may transfer to detox

## 2019-03-18 NOTE — PROGRESS NOTE ADULT - SUBJECTIVE AND OBJECTIVE BOX
[place]: Pablo [phone]  CHIEF COMPLAINT:    Patient is a 33y old  Male who presents with a chief complaint of Anemia (18 Mar 2019 07:41)      INTERVAL HPI/OVERNIGHT EVENTS:    Patient seen and examined at bedside. No acute overnight events occurred.    ROS: All other systems are negative.    Vital Signs:    T(F): 97.6 (03-18-19 @ 05:00), Max: 99.5 (03-17-19 @ 22:06)  HR: 76 (03-18-19 @ 05:00) (76 - 95)  BP: 123/59 (03-18-19 @ 05:00) (121/67 - 128/87)  RR: 16 (03-18-19 @ 05:00) (16 - 16)  SpO2: 97% (03-18-19 @ 06:31) (97% - 97%)  I&O's Summary      PHYSICAL EXAM:  GENERAL:  NAD  SKIN: No rashes or lesions  HEENT: Atraumatic. Normocephalic. Anicteric  NECK:  No JVD.   PULMONARY: Clear to ausculation bilaterally. No wheezing. No rales  CVS: Normal S1, S2. Regular rate and rhythm. No murmurs.  ABDOMEN/GI: Soft, Nontender, Nondistended; Bowel sounds are present  EXTREMITIES:  No edema B/L LE.  NEUROLOGIC:  No motor deficit.  PSYCH: Alert & oriented x 3, normal affect    Consultant(s) Notes Reviewed:  [x ] YES  [ ] NO  Care Discussed with Consultants/Other Providers [ x] YES  [ ] NO-Dr. Garcia    LABS:                        7.1    6.54  )-----------( 185      ( 17 Mar 2019 06:05 )             25.3     03-17    142  |  106  |  6<L>  ----------------------------<  92  3.6   |  23  |  0.6<L>    Ca    8.3<L>      17 Mar 2019 05:00  Mg     2.0     03-17    TPro  7.9  /  Alb  4.2  /  TBili  0.5  /  DBili  <0.2  /  AST  116<H>  /  ALT  32  /  AlkPhos  185<H>  03-17          RADIOLOGY & ADDITIONAL TESTS:  No new imaging    Medications:  Standing  chlordiazePOXIDE   Oral   chlordiazePOXIDE 20 milliGRAM(s) Oral every 4 hours  folic acid 1 milliGRAM(s) Oral daily  multivitamin 1 Tablet(s) Oral daily  pantoprazole  Injectable 40 milliGRAM(s) IV Push two times a day  thiamine 100 milliGRAM(s) Oral daily    PRN Meds

## 2019-03-18 NOTE — CONSULT NOTE ADULT - ATTENDING COMMENTS
Imaging reviewed, and case discussed with MD covering for Dr. Lynn.     There is right L3 transverse process fracture, mildly displaced.   For this, no surgical intervention indicated.     Recommend:  Pain control  NSAIDS, muscle relaxants   Abdominal binder PRN    If pain persists, can follow up in office.
as above

## 2019-03-19 VITALS
SYSTOLIC BLOOD PRESSURE: 113 MMHG | TEMPERATURE: 97 F | HEART RATE: 80 BPM | DIASTOLIC BLOOD PRESSURE: 73 MMHG | RESPIRATION RATE: 16 BRPM

## 2019-03-19 LAB
FERRITIN SERPL-MCNC: 20 NG/ML — LOW (ref 30–400)
GLUCOSE BLDC GLUCOMTR-MCNC: 107 MG/DL — HIGH (ref 70–99)

## 2019-03-19 RX ADMIN — Medication 20 MILLIGRAM(S): at 03:00

## 2019-03-19 NOTE — PROVIDER CONTACT NOTE (OTHER) - BACKGROUND
pt refused all medical treatment including medication and blood draws. alternatives discussed patient continued to refuse. pt states "I will run away from unit when I get the chance"

## 2019-03-19 NOTE — PROVIDER CONTACT NOTE (OTHER) - SITUATION
pt attempted to leave unit. stopped by RN.  phone utilized, ID # [id number]. MD Bean present for length of conversation. pt expressed that he no longer wants treatment and he wants to leave.

## 2019-03-19 NOTE — CHART NOTE - NSCHARTNOTEFT_GEN_A_CORE
( phone [phone] used throughout conversation with patient by his nurses and myself). Patient has announced that he is tired of taking pills and getting bloods drawn and he wants to leave the hospital now. States he feels fine. Based on conversation with  phone he is fully cognizant of current condition and is able to make decisions concerning his health. He is informed that he can refuse testing and even pills but also told he is scheduled to have endoscopy today. Then he was told that by leaving AMA he risks (including but not limited to) having seizures, heart attack or even death. This did not change his mind and after the AMA form was read to him in [place], he signed it. Told he can always come back if he wishes to.

## 2019-03-19 NOTE — PROVIDER CONTACT NOTE (OTHER) - ACTION/TREATMENT ORDERED:
IVL removed. patient signed out AMA,  AMA form completed by  phone. escorted off unit/out of facility with belongings.

## 2019-03-29 DIAGNOSIS — E51.9 THIAMINE DEFICIENCY, UNSPECIFIED: ICD-10-CM

## 2019-03-29 DIAGNOSIS — D56.9 THALASSEMIA, UNSPECIFIED: ICD-10-CM

## 2019-03-29 DIAGNOSIS — D64.9 ANEMIA, UNSPECIFIED: ICD-10-CM

## 2019-03-29 DIAGNOSIS — F17.210 NICOTINE DEPENDENCE, CIGARETTES, UNCOMPLICATED: ICD-10-CM

## 2019-03-29 DIAGNOSIS — Z59.0 HOMELESSNESS: ICD-10-CM

## 2019-03-29 DIAGNOSIS — F10.20 ALCOHOL DEPENDENCE, UNCOMPLICATED: ICD-10-CM

## 2019-03-29 DIAGNOSIS — D62 ACUTE POSTHEMORRHAGIC ANEMIA: ICD-10-CM

## 2019-03-29 DIAGNOSIS — F10.10 ALCOHOL ABUSE, UNCOMPLICATED: ICD-10-CM

## 2019-03-29 DIAGNOSIS — E53.8 DEFICIENCY OF OTHER SPECIFIED B GROUP VITAMINS: ICD-10-CM

## 2019-03-29 DIAGNOSIS — G93.89 OTHER SPECIFIED DISORDERS OF BRAIN: ICD-10-CM

## 2019-03-29 DIAGNOSIS — M84.48XA PATHOLOGICAL FRACTURE, OTHER SITE, INITIAL ENCOUNTER FOR FRACTURE: ICD-10-CM

## 2019-03-29 SDOH — ECONOMIC STABILITY - HOUSING INSECURITY: HOMELESSNESS: Z59.0

## 2019-04-02 ENCOUNTER — EMERGENCY (EMERGENCY)
Facility: HOSPITAL | Age: 34
LOS: 0 days | Discharge: HOME | End: 2019-04-03
Attending: EMERGENCY MEDICINE | Admitting: EMERGENCY MEDICINE
Payer: MEDICAID

## 2019-04-02 VITALS
HEART RATE: 54 BPM | TEMPERATURE: 98 F | RESPIRATION RATE: 18 BRPM | DIASTOLIC BLOOD PRESSURE: 61 MMHG | OXYGEN SATURATION: 98 % | SYSTOLIC BLOOD PRESSURE: 128 MMHG

## 2019-04-02 DIAGNOSIS — F10.129 ALCOHOL ABUSE WITH INTOXICATION, UNSPECIFIED: ICD-10-CM

## 2019-04-02 DIAGNOSIS — Z79.899 OTHER LONG TERM (CURRENT) DRUG THERAPY: ICD-10-CM

## 2019-04-02 PROCEDURE — 99284 EMERGENCY DEPT VISIT MOD MDM: CPT

## 2019-04-02 NOTE — ED PROVIDER NOTE - OBJECTIVE STATEMENT
33 yr M with hx of alcohol abuse presents after drinking with intoxication. Pt states that he drank several beer, drank vodka and also smoked weed. No falls or trauma. No other complaints. No aggravating or alleviating factors.

## 2019-04-02 NOTE — ED PROVIDER NOTE - PHYSICAL EXAMINATION
CONSTITUTIONAL: Well-developed; well-nourished; in no acute distress, nontoxic appearing however intoxicated  SKIN: skin exam is warm and dry,  HEAD: Normocephalic; atraumatic.  EYES: PERRL, 3 mm bilateral, no nystagmus, EOM intact; conjunctiva and sclera clear.  ENT: MMM, no nasal congestion  NECK: Supple; non tender.+ full passive ROM in all directions. No JVD  CARD: S1, S2 normal, no murmur  RESP: No wheezes, rales or rhonchi. Good air movement bilaterally  ABD: soft; non-distended; non-tender. No Rebound, No guarding  EXT: Normal ROM. No cyanosis or edema. Dp Pulses intact.   NEURO: awake, alert, following commands, oriented, grossly unremarkable. No Focal deficits. GCS 15 but speech is slurred due to alcohol intoxication.  PSYCH: Cooperative, appropriate.

## 2019-04-02 NOTE — ED PROVIDER NOTE - NSFOLLOWUPINSTRUCTIONS_ED_ALL_ED_FT
Follow up with your PMD and at the detox clinic    Alcohol Abuse    Alcohol intoxication occurs when the amount of alcohol that a person has consumed impairs his or her ability to mentally and physically function. Chronic alcohol consumption can also lead to a variety of health issues including neurological disease, stomach disease, heart disease, liver disease, etc. Do not drive after drinking alcohol. Drinking enough alcohol to end up in an Emergency Room suggests you may have an alcohol abuse problem. Seek help at a drug addiction center.    SEEK IMMEDIATE MEDICAL CARE IF YOU HAVE ANY OF THE FOLLOWING SYMPTOMS: seizures, vomiting blood, blood in your stool, lightheadedness/dizziness, or becoming shaky to tremulous when you stop drinking.

## 2019-04-02 NOTE — ED ADULT NURSE REASSESSMENT NOTE - NS ED NURSE REASSESS COMMENT FT1
Spoke to Attending patient had 2 apple juices, currently stable. No cardiac monitor required at this time per MD.

## 2019-04-02 NOTE — ED ADULT NURSE NOTE - NSIMPLEMENTINTERV_GEN_ALL_ED
Implemented All Fall with Harm Risk Interventions:  [place] to call system. Call bell, personal items and telephone within reach. Instruct patient to call for assistance. Room bathroom lighting operational. Non-slip footwear when patient is off stretcher. Physically safe environment: no spills, clutter or unnecessary equipment. Stretcher in lowest position, wheels locked, appropriate side rails in place. Provide visual cue, wrist band, yellow gown, etc. Monitor gait and stability. Monitor for mental status changes and reorient to person, place, and time. Review medications for side effects contributing to fall risk. Reinforce activity limits and safety measures with patient and family. Provide visual clues: red socks.

## 2019-04-02 NOTE — ED PROVIDER NOTE - CLINICAL SUMMARY MEDICAL DECISION MAKING FREE TEXT BOX
Pt presented after drinking beer, vodka and smoking weed. Was observed in the ED for > 9 hours. Currently sober. No complaints. WIll d/c with outpt detox.

## 2019-04-02 NOTE — ED PROVIDER NOTE - NS ED ROS FT
Review of Systems    Constitutional: As per HPI  Cardiovascular: (-) chest pain, (-) syncope  Respiratory: (-) cough, (-) shortness of breath  Gastrointestinal: (-) vomiting, (-) diarrhea, (-) abdominal pain  Musculoskeletal: (-) neck pain, (-) back pain, (-) joint pain  Integumentary: (-) rash, (-) edema  Neurological: + intoxication

## 2019-04-02 NOTE — ED PROVIDER NOTE - NSFOLLOWUPCLINICS_GEN_ALL_ED_FT
[facility]  Detox Mgmt  [address] [place] [address]  Phone: [phone]  Fax:   Follow Up Time: 4-6 Days

## 2019-04-03 VITALS
HEART RATE: 80 BPM | SYSTOLIC BLOOD PRESSURE: 116 MMHG | RESPIRATION RATE: 18 BRPM | DIASTOLIC BLOOD PRESSURE: 72 MMHG | TEMPERATURE: 97 F | OXYGEN SATURATION: 97 %

## 2019-08-07 ENCOUNTER — EMERGENCY (EMERGENCY)
Facility: HOSPITAL | Age: 34
LOS: 0 days | Discharge: HOME | End: 2019-08-08
Attending: EMERGENCY MEDICINE | Admitting: EMERGENCY MEDICINE
Payer: SUBSIDIZED

## 2019-08-07 VITALS
RESPIRATION RATE: 20 BRPM | DIASTOLIC BLOOD PRESSURE: 81 MMHG | HEART RATE: 57 BPM | SYSTOLIC BLOOD PRESSURE: 120 MMHG | OXYGEN SATURATION: 98 % | TEMPERATURE: 98 F

## 2019-08-07 DIAGNOSIS — F17.200 NICOTINE DEPENDENCE, UNSPECIFIED, UNCOMPLICATED: ICD-10-CM

## 2019-08-07 DIAGNOSIS — F10.129 ALCOHOL ABUSE WITH INTOXICATION, UNSPECIFIED: ICD-10-CM

## 2019-08-07 PROCEDURE — 99284 EMERGENCY DEPT VISIT MOD MDM: CPT

## 2019-08-07 NOTE — ED PROVIDER NOTE - NS ED ROS FT
GEN:  no fever, no chills  NEURO:  no headache, no dizziness  ENT: no sore throat, no runny nose  CV:  no chest pain, no SOB  RESP:  no dyspnea, no cough  GI:  no nausea, no vomiting, no abdominal pain  MSK:  no joint pain, no edema  SKIN:  no rash, no bruising  HEME: no hematochezia, no melena  PSYCH:  no homicidal ideation, no suicidal ideation, no visual hallucinations, no auditory hallucinations

## 2019-08-07 NOTE — ED PROVIDER NOTE - CLINICAL SUMMARY MEDICAL DECISION MAKING FREE TEXT BOX
Pt was brought in for alcohol intoxication.  Evaluated in the ED until clinically sober. WIll d/c with outpt f/up.    ED evaluation and management discussed with the patient and family (if available) in detail.  Close PMD follow up encouraged.  Strict ED return instructions discussed in detail and patient given the opportunity to ask any questions about their discharge diagnosis and instructions. Patient verbalized understanding.

## 2019-08-07 NOTE — ED PROVIDER NOTE - NSFOLLOWUPINSTRUCTIONS_ED_ALL_ED_FT
Previous cyclobenzaprine Rx incorrect sig.    We appreciate the \"good catch\"  
Alcohol Abuse    Alcohol intoxication occurs when the amount of alcohol that a person has consumed impairs his or her ability to mentally and physically function. Chronic alcohol consumption can also lead to a variety of health issues including neurological disease, stomach disease, heart disease, liver disease, etc. Do not drive after drinking alcohol. Drinking enough alcohol to end up in an Emergency Room suggests you may have an alcohol abuse problem. Seek help at a drug addiction center.    SEEK IMMEDIATE MEDICAL CARE IF YOU HAVE ANY OF THE FOLLOWING SYMPTOMS: seizures, vomiting blood, blood in your stool, lightheadedness/dizziness, or becoming shaky to tremulous when you stop drinking.

## 2019-08-07 NOTE — ED PROVIDER NOTE - ATTENDING CONTRIBUTION TO CARE
32 yo M pmh of alcohol abuse presents with alcohol intoxication. Was brought by ems. States that his brother called them. Admits to drinking tonight but doesn't state home much. Denies any falls.     CONSTITUTIONAL: Well-developed; visibly intoxicated   SKIN: warm, dry. 4 old staples noted in the right scalp, unclear when they were placed   HEAD: Normocephalic; atraumatic.  EYES: PERRL, no conjunctival erythema  ENT: No nasal discharge; airway clear.  NECK: Supple; non tender.  CARD: S1, S2 normal;  Regular rate and rhythm.   RESP: No wheezes, rales or rhonchi.  ABD: soft non tender, non distended, no rebound or guarding  EXT: Normal ROM.    LYMPH: No acute cervical adenopathy.  NEURO: Alert, oriented, grossly unremarkable.

## 2019-08-07 NOTE — ED PROVIDER NOTE - NSFOLLOWUPCLINICS_GEN_ALL_ED_FT
[facility]  Medicine  [address], [place]   Phone: [phone]  Fax:   Follow Up Time: 1-3 Days

## 2019-08-07 NOTE — ED PROVIDER NOTE - PHYSICAL EXAMINATION
CONSTITUTIONAL: well developed, nontoxic appearing, in no acute distress, speaking in full sentences  SKIN: warm, dry, no rash, cap refill < 2 seconds  HEENT: normocephalic, atraumatic, no conjunctival erythema, moist mucous membranes, patent airway  NECK: supple, no masses  CV:  regular rate, regular rhythm  RESP: no wheezes, no rales, no rhonchi, normal work of breathing  ABD: soft, nontender, nondistended, no rebound, no guarding  MSK: normal ROM, no cyanosis, no edema  NEURO: sleeping, arousable, answers questions appropriately, grossly unremarkable  PSYCH: cooperative, appropriate

## 2019-08-07 NOTE — ED PROVIDER NOTE - OBJECTIVE STATEMENT
34 yo M with hx of etoh abuse who was BIBEMS for etoh intoxication. Pt without any complaints at this time. Sleeping comfortably. No SI, HI, auditory/visual hallucinations. Admits that he has a problem and drank too much.

## 2019-08-07 NOTE — ED ADULT NURSE NOTE - NSIMPLEMENTINTERV_GEN_ALL_ED
Implemented All Fall Risk Interventions:  [place] to call system. Call bell, personal items and telephone within reach. Instruct patient to call for assistance. Room bathroom lighting operational. Non-slip footwear when patient is off stretcher. Physically safe environment: no spills, clutter or unnecessary equipment. Stretcher in lowest position, wheels locked, appropriate side rails in place. Provide visual cue, wrist band, yellow gown, etc. Monitor gait and stability. Monitor for mental status changes and reorient to person, place, and time. Review medications for side effects contributing to fall risk. Reinforce activity limits and safety measures with patient and family.

## 2019-08-07 NOTE — ED PROVIDER NOTE - PROGRESS NOTE DETAILS
Pt sleeping comfortably Patient signed out to Dr. Almaguer pending sobriety and reassessment. Signout accepted from Dr. Nolan Pt still sleeping comfortably, no complaints Pt clinically sober, ambulated in ED without difficulty. Will d/c.

## 2019-08-07 NOTE — ED ADULT NURSE NOTE - OBJECTIVE STATEMENT
Pt presents to ED with alcohol intoxication. Awake and alert, able to make needs known. No SOB or distress noted. Patient denies any complaints of pain or discomfort upon assessment. No vomiting or nausea. No seizure activity noted.

## 2019-08-08 NOTE — PATIENT PROFILE ADULT - LAST ORAL INTAKE
BRIEF OPERATIVE NOTE    Date of Procedure: 8/8/2019   Preoperative Diagnosis: Other mechanical complication of other internal orthopedic devices, implants and grafts, initial encounter [facility]) [P96.853E]  Postoperative Diagnosis: Other mechanical complication of other internal orthopedic devices, implants and grafts, [facility] [V61.955I]    Procedure(s):  LEFT FOOT HARDWARE REMOVAL/ CHOICE  Surgeon(s) and Role:     * George Chow MD - Primary         Surgical Assistant: no    Surgical Staff:  Circ-1: Elena Blunt RN  Circ-2: Alisson Rosado  Radiology Technician: Trupti Cobos  Scrub Tech-1: Yariel Rose  Event Time In Time Out   Incision Start 1301    Incision Close 1326      Anesthesia: MAC   Estimated Blood Loss: min  Specimens: * No specimens in log *   Findings: no  Complications: no  Implants: * No implants in log * 17-Mar-2019

## 2020-01-09 ENCOUNTER — EMERGENCY (EMERGENCY)
Facility: HOSPITAL | Age: 35
LOS: 0 days | Discharge: HOME | End: 2020-01-09
Attending: EMERGENCY MEDICINE | Admitting: EMERGENCY MEDICINE
Payer: SUBSIDIZED

## 2020-01-09 VITALS
RESPIRATION RATE: 16 BRPM | SYSTOLIC BLOOD PRESSURE: 111 MMHG | HEART RATE: 72 BPM | TEMPERATURE: 97 F | OXYGEN SATURATION: 100 % | DIASTOLIC BLOOD PRESSURE: 75 MMHG

## 2020-01-09 VITALS
SYSTOLIC BLOOD PRESSURE: 101 MMHG | DIASTOLIC BLOOD PRESSURE: 51 MMHG | OXYGEN SATURATION: 99 % | RESPIRATION RATE: 16 BRPM | HEART RATE: 80 BPM

## 2020-01-09 DIAGNOSIS — F10.129 ALCOHOL ABUSE WITH INTOXICATION, UNSPECIFIED: ICD-10-CM

## 2020-01-09 PROCEDURE — 99283 EMERGENCY DEPT VISIT LOW MDM: CPT

## 2020-01-09 NOTE — ED PROVIDER NOTE - NSFOLLOWUPCLINICS_GEN_ALL_ED_FT
[facility]  Detox Mgmt  [address] [place] [address]  Phone: [phone]  Fax:   Follow Up Time:

## 2020-01-09 NOTE — ED PROVIDER NOTE - NSFOLLOWUPINSTRUCTIONS_ED_ALL_ED_FT
Follow up with PMD in 1-2 days.    Alcohol Abuse    Alcohol intoxication occurs when the amount of alcohol that a person has consumed impairs his or her ability to mentally and physically function. Chronic alcohol consumption can also lead to a variety of health issues including neurological disease, stomach disease, heart disease, liver disease, etc. Do not drive after drinking alcohol. Drinking enough alcohol to end up in an Emergency Room suggests you may have an alcohol abuse problem. Seek help at a drug addiction center.    SEEK IMMEDIATE MEDICAL CARE IF YOU HAVE ANY OF THE FOLLOWING SYMPTOMS: seizures, vomiting blood, blood in your stool, lightheadedness/dizziness, or becoming shaky to tremulous when you stop drinking.

## 2020-01-09 NOTE — ED ADULT NURSE NOTE - OBJECTIVE STATEMENT
Pt presents to the ED with c/o alcohol intox. Pt states he drank 5 beers today. Denies SI/HI. Denies taking drugs.

## 2020-01-09 NOTE — ED ADULT NURSE NOTE - NSIMPLEMENTINTERV_GEN_ALL_ED
Implemented All Universal Safety Interventions:  [place] to call system. Call bell, personal items and telephone within reach. Instruct patient to call for assistance. Room bathroom lighting operational. Non-slip footwear when patient is off stretcher. Physically safe environment: no spills, clutter or unnecessary equipment. Stretcher in lowest position, wheels locked, appropriate side rails in place.

## 2020-01-09 NOTE — ED ADULT NURSE NOTE - ALCOHOL PRE SCREEN (AUDIT - C)
January 22, 2019      Judy Tee, OD  [address] [facility] [address]           [place] [facility] Op[facility]mology  [address]  [place] [address]  Phone: [phone]  Fax: [phone]          Patient: Jud Villa   MR Number: [id number]   YOB: 1958   Date of Visit: 1/22/2019       Dear Dr. Judy Tee:    Thank you for referring Jud Villa to me for evaluation. Attached you will find relevant portions of my assessment and plan of care.    If you have questions, please do not hesitate to call me. I look forward to following Jud Villa along with you.    Sincerely,    Claribel Pereira MD    Enclosure  CC:  No Recipients    If you would like to receive this communication electronically, please contact [email or web address] or [phone] to request more information on [facility] Link access.    For providers and/or their staff who would like to refer a patient to [facility], please contact us through our one-stop-shop provider referral line, [facility], at [phone].    If you feel you have received this communication in error or would no longer like to receive these types of communications, please e-mail [email or web address]          Statement Selected

## 2020-01-09 NOTE — ED PROVIDER NOTE - ATTENDING CONTRIBUTION TO CARE
33 yo m with pmh of etoh abuse, presents with etoh intox.  says drank 4 beers.  lives on SI.  no assault or falls.  no cp, sob, abd pain.  exam: nad, ncat, perrl, eomi, mmm, rrr, ctab, abd soft, nt,nd aox2, aob imp: pt with etoh abuse, intoxicated

## 2020-01-09 NOTE — ED PROVIDER NOTE - CLINICAL SUMMARY MEDICAL DECISION MAKING FREE TEXT BOX
Pt presented to ED for alcohol intoxication. No evidence of trauma. Observed until sobriety. No complaints. VSS. Results reviewed and discussed with pt and printed for patient. Anticipatory guidance given including close outpatient followup. Strict return precautions given. Pt verbalizes understanding of and agrees with plan.

## 2020-01-09 NOTE — ED PROVIDER NOTE - PROGRESS NOTE DETAILS
The patient is now awake and alert, clinically sober.  Able to walk a straight line.  Repeat exam and neuro/cranial nerve exams normal.  No evidence of head/neck trauma.  Patient denies any pain or other complaints.  Denies cp/sob/ha/abd pain.  Abd soft, lungs clear, heart exam normal.  Tolerating PO challenge.  Patient says only used alcohol no other substances.  Feels much better and pt feels safe for discharge.  No evidence of intoxication at this time or alcohol withdrawal.  No other complaints on discharge.

## 2020-01-09 NOTE — ED PROVIDER NOTE - PATIENT PORTAL LINK FT
You can access the FollowMyHealth Patient Portal offered by [facility] by registering at the following website: [email or web address]. By joining [employer]’s FollowMyHealth portal, you will also be able to view your health information using other applications (apps) compatible with our system.

## 2020-11-19 NOTE — ED ADULT TRIAGE NOTE - ARRIVAL FROM
Mother Angelica Garcia notified of phone number to [facility] and pin code required for communication with staff, informed there are no visiting hours at this time. All belongings taken by Mother home. Phoned [facility] and spoke with Jessy Hollingsworth and notified of patient departure.  Communicated there are letters from Mom in packet for patient and Mother would like if staff could ask patient for consent to speak with her after arrival.     David Hughes RN  11/19/20 [address]

## 2022-03-07 NOTE — ED ADULT NURSE NOTE - NSINTERVENTIONOPT_GEN_ALL_ED
Form has been filled out by Dr Reyes and can be picked up in her office. It is in the patient  draw[facility] . It can be picked up Mom-Fri 8am to 4:45pm.    No Release on file for [place]  
Hour[place] Rounding

## 2023-10-02 NOTE — ED ADULT NURSE NOTE - NSFALLRSKHMRSKTYOTFT_ED_ALL_ED
Procedure:  IR PROSTATE ARTERY EMBOLIZATION    Relevant Problems   /RENAL   (+) BPH with obstruction/lower urinary tract symptoms   (+) Benign prostatic hyperplasia with urinary retention   (+) Stage 3 chronic kidney disease, unspecified whether stage 3a or 3b CKD (HCC)        Physical Exam    Airway    Mallampati score: II  TM Distance: >3 FB  Neck ROM: full     Dental       Cardiovascular  Cardiovascular exam normal    Pulmonary  Pulmonary exam normal     Other Findings        Anesthesia Plan  ASA Score- 2     Anesthesia Type- general with ASA Monitors. Additional Monitors:   Airway Plan: ETT. Plan Factors-Exercise tolerance (METS): >4 METS. Chart reviewed. EKG reviewed. Existing labs reviewed. Patient summary reviewed. Patient is not a current smoker. Patient not instructed to abstain from smoking on day of procedure. Patient did not smoke on day of surgery. Induction- intravenous. Postoperative Plan- Plan for postoperative opioid use. Informed Consent- Anesthetic plan and risks discussed with patient and spouse. I personally reviewed this patient with the CRNA. Discussed and agreed on the Anesthesia Plan with the CRNA. .            Lab Results   Component Value Date    HGBA1C 5.6 06/25/2021       Lab Results   Component Value Date    K 3.7 08/02/2023     (H) 08/02/2023    CO2 26 08/02/2023    BUN 19 08/02/2023    CREATININE 1.20 08/02/2023    GLUF 88 08/02/2023    CALCIUM 9.0 08/02/2023    AST 13 08/02/2023    ALT 15 08/02/2023    ALKPHOS 60 08/02/2023    EGFR 59 08/02/2023       Lab Results   Component Value Date    WBC 5.42 08/02/2023    HGB 13.0 08/02/2023    HCT 43.3 08/02/2023    MCV 72 (L) 08/02/2023     08/02/2023   NSR Normal ECG
intox

## 2024-01-18 NOTE — CONSULT NOTE ADULT - SUBJECTIVE AND OBJECTIVE BOX
Chief complaint/Reason for consult: Anemia and Intermittent Melenic Stools     HPI: 32 yo male phm ETOH abuse 6-7 beers a day for 6 years, presents for alcohol detox. Patient denies nausea, vomiting, hematemesis, abdominal pian, diarrhea, constipation. +Intermittent melenic stools. Patient states he had one melenic formed bowel movement at 6pm yesterday and one episode at 8AM today. Patient has never had an endoscopy or colonoscopy.      PMHX/PSHX:  PAST MEDICAL & SURGICAL HISTORY:  ETOH abuse  No significant past surgical history        Family history:  FAMILY HISTORY:  Family history of anemia: denies history of anemia    No GI cancers in first or second degree relatives    Social History: No smoking. +6-7 Beers alcohol for 6 years. No illegal drug use.    Allergies:  No Known Allergies      MEDICATIONS:MEDICATIONS  (STANDING):  chlordiazePOXIDE   Oral   chlordiazePOXIDE 20 milliGRAM(s) Oral every 4 hours  folic acid 1 milliGRAM(s) Oral daily  multivitamin 1 Tablet(s) Oral daily  pantoprazole  Injectable 40 milliGRAM(s) IV Push two times a day  thiamine 100 milliGRAM(s) Oral daily        REVIEW OF SYSTEMS  General:  No weight loss, fevers, or chills.  Eyes:  No reported pain or visual changes  ENT:  No sore throat or runny nose.  NECK: No stiffness or lymphadenopathy  CV:  No chest pain or palpitations.  Resp:  No shortness of breath, cough, wheezing or hemoptysis  GI:  No abdominal pain, nausea, vomiting, dysphagia, diarrhea or constipation. No rectal bleeding,+intermitent melenic stools, No hematemesis.  Muscle:  No aches or weakness  Neuro:  No tingling, numbness       VITALS:   T(F): 97.6 (03-18-19 @ 05:00), Max: 99.5 (03-17-19 @ 22:06)  HR: 76 (03-18-19 @ 05:00) (76 - 95)  BP: 123/59 (03-18-19 @ 05:00) (121/67 - 128/87)  RR: 16 (03-18-19 @ 05:00) (16 - 16)  SpO2: 97% (03-18-19 @ 06:31) (97% - 97%)    PHYSICAL EXAM:  GENERAL: AAOx3, no acute distress.  HEAD:  Atraumatic, Normocephalic  EYES: conjunctiva and sclera clear  NECK: Supple, No thyromegaly   CHEST/LUNG: Clear to auscultation bilaterally; No wheeze, rhonchi, or rales  HEART: Regular rate and rhythm; normal S1, S2, No murmurs.  ABDOMEN: Soft, nontender, nondistended; Bowel sounds present, no abdominal bruit, masses, or hepatosplenomegaly  NEUROLOGY: No asterixis or tremor  SKIN: Intact, no jaundice  Rectal Exam-Dark Brown stool on FADY. No black stool noted on finger         LABS:  03-17    142  |  106  |  6<L>  ----------------------------<  92  3.6   |  23  |  0.6<L>    Ca    8.3<L>      17 Mar 2019 05:00  Mg     2.0     03-17    TPro  7.9  /  Alb  4.2  /  TBili  0.5  /  DBili  <0.2  /  AST  116<H>  /  ALT  32  /  AlkPhos  185<H>  03-17                          7.1    6.54  )-----------( 185      ( 17 Mar 2019 06:05 )             25.3     LIVER FUNCTIONS - ( 17 Mar 2019 05:00 )  Alb: 4.2 g/dL / Pro: 7.9 g/dL / ALK PHOS: 185 U/L / ALT: 32 U/L / AST: 116 U/L / GGT: x               IMAGING:  < from: CT Abdomen and Pelvis w/ IV Cont (03.17.19 @ 07:00) >    EXAM:  CT ABDOMEN AND PELVIS IC        EXAM:  CT CHEST IC            PROCEDURE DATE:  03/17/2019            INTERPRETATION:  CLINICAL STATEMENT: Fall.      TECHNIQUE: Contiguous axial CT images were obtained from the thoracic   inlet to the pubic symphysis following administration of 100 cc Optiray   320 intravenous contrast.  Oral contrast was not administered.    Reformatted images in the coronal and sagittal planes were acquired.    COMPARISON CT: None.       FINDINGS:    CHEST:    LUNGS/PLEURA/AIRWAYS: Central airways are patent. No focal parenchymal   consolidation, pleural effusion, or pneumothorax.    MEDIASTINUM/THORACIC NODES: No enlarged mediastinal lymph nodes.   Visualized thyroid gland is unremarkable.    HEART/GREAT VESSELS: Normal heart size. No pericardial effusion.      ABDOMEN/PELVIS:    HEPATOBILIARY: Right hepatic lobe calcified granulomas. Otherwise   unremarkable.    SPLEEN: Unremarkable.    PANCREAS: Unremarkable.    ADRENAL GLANDS: Unremarkable.    KIDNEYS: No hydronephrosis.    ABDOMINOPELVIC NODES: Unremarkable.    PELVIC ORGANS: Unremarkable.    PERITONEUM/MESENTERY/BOWEL: No evidence for bowel obstruction, ascites,   or pneumoperitoneum. Unremarkable appendix.    BONES/SOFT TISSUES: Small right fat-containing hernia. Questionable   nondisplaced left third rib fracture. There is a definite nondisplaced   fracture involving the right L3 transverse process.      IMPRESSION:     Nondisplaced fracture involving right L3 transverse process.    Otherwise, no evidence for acute intrathoracic or intra-abdominal   traumatic injury.    Questionable nondisplaced left third rib fracture versus motion artifact.   Please correlate with point tenderness.              JENNIFER LEROY M.D., RESIDENT RADIOLOGIST  Thisdocument has been electronically signed.  ROSELIA VICK M.D., ATTENDING RADIOLOGIST  This document has been electronically signed. Mar 17 2019  7:25AM        < end of copied text >
DETOX  CONSULT    Pt interviewed, chart reviewed  Pt admitted with intoxication, found on the stree.  Lives in shelter  ED w/u showed marked anemia, Hgb7.1 and vertebral Fx  h/o passage of black stools the day before    Pt interviewed, examined and EMR chart reviewed.  Hx of ETOH abuse, has been drinking for __many___ years/months  variable periods of sobriety in the past.  Has been in detox before _____yes,   ___x__No  No hx of Sz, (+) tremors hx but better today    SOCIAL HISTORY:ETOH    REVIEW OF SYSTEMS:    Constitutional: No fever, weight loss or fatigue  ENT:  No difficulty hearing, tinnitus, vertigo; No sinus or throat pain  Neck: No pain or stiffness  Respiratory: No cough, wheezing, chills or hemoptysis  Cardiovascular: No chest pain, palpitations, shortness of breath, dizziness or leg swelling  Gastrointestinal: No abdominal or epigastric pain. No nausea, vomiting or hematemesis; No diarrhea or constipation. No melena or hematochezia.  Neurological: No headaches, memory loss, loss of strength, numbness or tremors  Musculoskeletal: (++)  joint pain or swelling; No muscle, back or extremity pain  Psychiatric: No depression, anxiety, mood swings or difficulty sleeping    MEDICATIONS  (STANDING):  chlordiazePOXIDE   Oral   chlordiazePOXIDE 20 milliGRAM(s) Oral every 4 hours  folic acid 1 milliGRAM(s) Oral daily  multivitamin 1 Tablet(s) Oral daily  pantoprazole  Injectable 40 milliGRAM(s) IV Push two times a day  thiamine 100 milliGRAM(s) Oral daily    MEDICATIONS  (PRN):      Vital Signs Last 24 Hrs  T(C): 36.4 (18 Mar 2019 05:00), Max: 37.5 (17 Mar 2019 22:06)  T(F): 97.6 (18 Mar 2019 05:00), Max: 99.5 (17 Mar 2019 22:06)  HR: 76 (18 Mar 2019 05:00) (76 - 97)  BP: 123/59 (18 Mar 2019 05:00) (117/69 - 128/87)  BP(mean): --  RR: 16 (18 Mar 2019 05:00) (16 - 18)  SpO2: 97% (18 Mar 2019 06:31) (97% - 100%)    PHYSICAL EXAM:    Constitutional: NAD, well-groomed, well-developed  HEENT: PERRLA, EOMI, Normal Hearing, MMM  Neck: No LAD, No JVD  Back: Normal spine flexure, No CVA tenderness  Respiratory: CTAB/L  Cardiovascular: S1 and S2, RRR, no M/G/R  Gastrointestinal: BS+, soft, NT/ND  Extremities: No peripheral edema  Vascular: 2+ peripheral pulses  Neurological: A/O x 3, no focal deficits    LABS:                        7.1    6.54  )-----------( 185      ( 17 Mar 2019 06:05 )             25.3     03-17    142  |  106  |  6<L>  ----------------------------<  92  3.6   |  23  |  0.6<L>    Ca    8.3<L>      17 Mar 2019 05:00  Mg     2.0     03-17    TPro  7.9  /  Alb  4.2  /  TBili  0.5  /  DBili  <0.2  /  AST  116<H>  /  ALT  32  /  AlkPhos  185<H>  03-17        Drug Screen Urine:  Alcohol Level  Alcohol, Blood: 171 mg/dL (03-17-19 @ 05:00)        RADIOLOGY & ADDITIONAL STUDIES:  Reviewed in PACS
Patient is a 33y old  Male who presents with a chief complaint of Anemia, was found asleep on street. +hx of EtOH dependence with back pain complaints as well.    CT Abd pelvis showed right L3 transverse process fracture.
Her/She

## 2024-09-08 NOTE — PATIENT PROFILE ADULT - NSPROSPHOSPCHAPLAINYN_GEN_A_NUR
Goal Outcome Evaluation:  Plan of Care Reviewed With: patient        Progress: no change  Outcome Evaluation: Patient presents with balance, strength and endurance limitations that impede his/her ability to perform ADLS. The skills of a therapist are necessary to maximize independence with ADLs.      Anticipated Discharge Disposition (OT): inpatient rehabilitation facility                         no

## 2025-05-29 NOTE — PATIENT PROFILE ADULT - NSPROPTRIGHTSUPPORTPERSON_GEN_A_NUR
Detail Level: Detailed Photo Preface (Leave Blank If You Do Not Want): Photograph(s) are available for future reference declines